# Patient Record
Sex: MALE | Race: WHITE
[De-identification: names, ages, dates, MRNs, and addresses within clinical notes are randomized per-mention and may not be internally consistent; named-entity substitution may affect disease eponyms.]

---

## 2020-07-31 ENCOUNTER — HOSPITAL ENCOUNTER (EMERGENCY)
Dept: HOSPITAL 62 - ER | Age: 30
LOS: 1 days | Discharge: HOME | End: 2020-08-01
Payer: OTHER GOVERNMENT

## 2020-07-31 DIAGNOSIS — Z88.0: ICD-10-CM

## 2020-07-31 DIAGNOSIS — R00.0: ICD-10-CM

## 2020-07-31 DIAGNOSIS — R50.9: ICD-10-CM

## 2020-07-31 DIAGNOSIS — L03.317: ICD-10-CM

## 2020-07-31 DIAGNOSIS — L05.01: Primary | ICD-10-CM

## 2020-07-31 DIAGNOSIS — Z87.891: ICD-10-CM

## 2020-07-31 LAB
ADD MANUAL DIFF: NO
ALBUMIN SERPL-MCNC: 4.6 G/DL (ref 3.5–5)
ALP SERPL-CCNC: 81 U/L (ref 38–126)
ANION GAP SERPL CALC-SCNC: 8 MMOL/L (ref 5–19)
AST SERPL-CCNC: 30 U/L (ref 17–59)
BASE EXCESS BLDV CALC-SCNC: -0.4 MMOL/L
BASOPHILS # BLD AUTO: 0.2 10^3/UL (ref 0–0.2)
BASOPHILS NFR BLD AUTO: 0.8 % (ref 0–2)
BILIRUB DIRECT SERPL-MCNC: 0.1 MG/DL (ref 0–0.4)
BILIRUB SERPL-MCNC: 0.9 MG/DL (ref 0.2–1.3)
BUN SERPL-MCNC: 10 MG/DL (ref 7–20)
CALCIUM: 9.4 MG/DL (ref 8.4–10.2)
CHLORIDE SERPL-SCNC: 102 MMOL/L (ref 98–107)
CO2 SERPL-SCNC: 27 MMOL/L (ref 22–30)
EOSINOPHIL # BLD AUTO: 0 10^3/UL (ref 0–0.6)
EOSINOPHIL NFR BLD AUTO: 0.2 % (ref 0–6)
ERYTHROCYTE [DISTWIDTH] IN BLOOD BY AUTOMATED COUNT: 12.4 % (ref 11.5–14)
GLUCOSE SERPL-MCNC: 102 MG/DL (ref 75–110)
HCO3 BLDV-SCNC: 23.9 MMOL/L (ref 20–32)
HCT VFR BLD CALC: 44.4 % (ref 37.9–51)
HGB BLD-MCNC: 15.2 G/DL (ref 13.5–17)
INR PPP: 1
LYMPHOCYTES # BLD AUTO: 2.5 10^3/UL (ref 0.5–4.7)
LYMPHOCYTES NFR BLD AUTO: 12.5 % (ref 13–45)
MCH RBC QN AUTO: 30.8 PG (ref 27–33.4)
MCHC RBC AUTO-ENTMCNC: 34.3 G/DL (ref 32–36)
MCV RBC AUTO: 90 FL (ref 80–97)
MONOCYTES # BLD AUTO: 1.1 10^3/UL (ref 0.1–1.4)
MONOCYTES NFR BLD AUTO: 5.5 % (ref 3–13)
NEUTROPHILS # BLD AUTO: 16.1 10^3/UL (ref 1.7–8.2)
NEUTS SEG NFR BLD AUTO: 81 % (ref 42–78)
PCO2 BLDV: 38.4 MMHG (ref 35–63)
PH BLDV: 7.41 [PH] (ref 7.3–7.42)
PLATELET # BLD: 276 10^3/UL (ref 150–450)
POTASSIUM SERPL-SCNC: 4.4 MMOL/L (ref 3.6–5)
PROT SERPL-MCNC: 7.8 G/DL (ref 6.3–8.2)
PROTHROMBIN TIME: 13.4 SEC (ref 11.4–15.4)
RBC # BLD AUTO: 4.95 10^6/UL (ref 4.35–5.55)
TOTAL CELLS COUNTED % (AUTO): 100 %
WBC # BLD AUTO: 19.8 10^3/UL (ref 4–10.5)

## 2020-07-31 PROCEDURE — 85025 COMPLETE CBC W/AUTO DIFF WBC: CPT

## 2020-07-31 PROCEDURE — 96361 HYDRATE IV INFUSION ADD-ON: CPT

## 2020-07-31 PROCEDURE — 80053 COMPREHEN METABOLIC PANEL: CPT

## 2020-07-31 PROCEDURE — 87077 CULTURE AEROBIC IDENTIFY: CPT

## 2020-07-31 PROCEDURE — 87070 CULTURE OTHR SPECIMN AEROBIC: CPT

## 2020-07-31 PROCEDURE — 87205 SMEAR GRAM STAIN: CPT

## 2020-07-31 PROCEDURE — 96375 TX/PRO/DX INJ NEW DRUG ADDON: CPT

## 2020-07-31 PROCEDURE — 93005 ELECTROCARDIOGRAM TRACING: CPT

## 2020-07-31 PROCEDURE — 93010 ELECTROCARDIOGRAM REPORT: CPT

## 2020-07-31 PROCEDURE — 36415 COLL VENOUS BLD VENIPUNCTURE: CPT

## 2020-07-31 PROCEDURE — 10080 I&D PILONIDAL CYST SIMPLE: CPT

## 2020-07-31 PROCEDURE — 96365 THER/PROPH/DIAG IV INF INIT: CPT

## 2020-07-31 PROCEDURE — 85610 PROTHROMBIN TIME: CPT

## 2020-07-31 PROCEDURE — 82803 BLOOD GASES ANY COMBINATION: CPT

## 2020-07-31 PROCEDURE — 87075 CULTR BACTERIA EXCEPT BLOOD: CPT

## 2020-07-31 PROCEDURE — 99283 EMERGENCY DEPT VISIT LOW MDM: CPT

## 2020-07-31 PROCEDURE — 83605 ASSAY OF LACTIC ACID: CPT

## 2020-07-31 PROCEDURE — 87040 BLOOD CULTURE FOR BACTERIA: CPT

## 2020-07-31 NOTE — ER DOCUMENT REPORT
ED Skin Rash/Insect Bite/Abscs





- General


Chief Complaint: Cyst


Stated Complaint: CYST,FEVER


Time Seen by Provider: 07/31/20 20:43


Primary Care Provider: 


EMI OWENS [Primary Care Provider] - Follow up as needed


Mode of Arrival: Ambulatory


Information source: Patient


Notes: 





Patient presents complaining of pilonidal abscess for the past week.  Patient 

was seen at his doctor's office this afternoon and had an incision and drainage 

procedure performed.  Patient has not been able to get his antibiotics filled 

just yet and developed a fever.  Patient called his doctor who advised him to 

come to the ER for further management.





- HPI


Patient complains to provider of: Tender/swollen area


Onset: Last week


Onset/Duration: Worse


Quality of pain: Sharp


Pain Level: 5


Skin Character: Abscess, Swelling, Tenderness, Warm


Skin Temperature: Warm


Quality of rash: Painful


Exacerbated by: Movement


Relieved by: Denies


Similar symptoms previously: Yes


Recently seen / treated by doctor: Yes





- Related Data


Allergies/Adverse Reactions: 


                                        





Penicillins Allergy (Verified 07/31/20 21:38)


   











Past Medical History





- General


Information source: Patient





- Social History


Smoking Status: Former Smoker


Frequency of alcohol use: Occasional


Drug Abuse: None


Occupation: None


Lives with: Family


Family History: Reviewed & Not Pertinent





- Medical History


Medical History: Negative


Past Surgical History: Reports: Hx Tonsillectomy





Review of Systems





- Review of Systems


Constitutional: Fever


EENT: No symptoms reported


Cardiovascular: No symptoms reported.  denies: Chest pain, Dizziness


Respiratory: No symptoms reported.  denies: Cough, Short of breath


Gastrointestinal: No symptoms reported.  denies: Vomiting


Genitourinary: No symptoms reported


Male Genitourinary: No symptoms reported


Musculoskeletal: No symptoms reported


Skin: Other - Abscess with surrounding erythema


Hematologic/Lymphatic: No symptoms reported


Neurological/Psychological: No symptoms reported





Physical Exam





- Vital signs


Vitals: 


                                        











Temp Pulse Resp BP Pulse Ox


 


 102.5 F H  118 H  28 H  116/75   99 


 


 07/31/20 20:08  07/31/20 20:08  07/31/20 20:08  07/31/20 20:08  07/31/20 20:08














- General


General appearance: Appears well, Alert


In distress: None





- HEENT


Head: Normocephalic, Atraumatic


Eyes: Normal


Conjunctiva: Normal


Nasal: Normal


Mouth/Lips: Normal


Neck: Normal, Supple.  No: Lymphadenopathy





- Respiratory


Respiratory status: No respiratory distress


Chest status: Nontender


Breath sounds: Normal.  No: Rales, Rhonchi, Stridor


Chest palpation: Normal





- Cardiovascular


Rhythm: Tachycardia


Heart sounds: S1 appreciated, S2 appreciated


Murmur: No





- Abdominal


Inspection: Normal


Distension: No distension


Bowel sounds: Normal


Tenderness: Nontender





- Back


Back: Normal, Nontender





- Extremities


General upper extremity: Normal inspection, Normal ROM


General lower extremity: Normal inspection, Normal ROM





- Neurological


Neuro grossly intact: Yes


Cognition: Normal


[place] Coma Scale Eye Opening: Spontaneous


[place] Coma Scale Verbal: Oriented


[place] Coma Scale Motor: Obeys Commands


[place] Coma Scale Total: 15





- Psychological


Associated symptoms: Normal affect, Normal mood





- Skin


Skin Temperature: Warm


Skin Moisture: Dry


Skin Color: Erythema


Skin irregularity: Abscess - Patient with fluctuant pilonidal abscess patient 

with very small 0.5 cm incision with iodoform gauze in place





Course





- Re-evaluation


Re-evalutation: 





07/31/20 23:03


Consulted with Dr. Villagran regarding patient presentation and choice of 

antibiotics.  He advises giving a dose of clindamycin IV and giving 450 orally 3

times daily


08/01/20 00:26


Patient defervesced and vital signs have improved.  Patient does have a marked 

leukocytosis although does have an abscess with surrounding cellulitis.  Abscess

was drained and patient tolerated procedure well.  Wound culture was obtained.  

Patient with no elevation in lactic acid at this time.  Patient nontoxic in 

appearance.  Discussed with patient worsening signs or symptoms that he should 

return immediately for.  Patient verbalized understanding and is agreeable with 

discharge plan of care at this time.


08/01/20 00:34


Consulted with Dr. Villagran regarding discharge plan of care.  Dr. Villagran agrees

with plan for discharge as the patient is clinically improved with stable vital 

signs.





- Vital Signs


Vital signs: 


                                        











Temp Pulse Resp BP Pulse Ox


 


 97.3 F   87   16   123/68   97 


 


 08/01/20 00:48  08/01/20 00:48  08/01/20 00:48  08/01/20 00:48  08/01/20 00:48














- Laboratory


Result Diagrams: 


                                 07/31/20 21:20





                                 07/31/20 21:20


Laboratory results interpreted by me: 


                                        











  07/31/20 07/31/20 07/31/20





  21:20 21:20 23:43


 


WBC  19.8 H  


 


Lymph % (Auto)  12.5 L  


 


Absolute Neuts (auto)  16.1 H  


 


Seg Neutrophils %  81.0 H  


 


Sodium   136.7 L 


 


Lactic Acid    0.6 L











                              Labs- All tests 24 hr











  07/31/20 07/31/20 07/31/20





  21:20 21:20 21:20


 


WBC  19.8 H  


 


RBC  4.95  


 


Hgb  15.2  


 


Hct  44.4  


 


MCV  90  


 


MCH  30.8  


 


MCHC  34.3  


 


RDW  12.4  


 


Plt Count  276  


 


Lymph % (Auto)  12.5 L  


 


Mono % (Auto)  5.5  


 


Eos % (Auto)  0.2  


 


Baso % (Auto)  0.8  


 


Absolute Neuts (auto)  16.1 H  


 


Absolute Lymphs (auto)  2.5  


 


Absolute Monos (auto)  1.1  


 


Absolute Eos (auto)  0.0  


 


Absolute Basos (auto)  0.2  


 


Seg Neutrophils %  81.0 H  


 


PT   13.4 


 


INR   1.00 


 


VBG pH   


 


VBG pCO2   


 


VBG HCO3   


 


VBG Base Excess   


 


Sodium    136.7 L


 


Potassium    4.4


 


Chloride    102


 


Carbon Dioxide    27


 


Anion Gap    8


 


BUN    10


 


Creatinine    0.94


 


Est GFR ( Amer)    > 60


 


Est GFR (MDRD) Non-Af    > 60


 


Glucose    102


 


Lactic Acid   


 


Calcium    9.4


 


Total Bilirubin    0.9


 


Direct Bilirubin    0.1


 


Neonat Total Bilirubin    Not Reportable


 


Neonat Direct Bilirubin    Not Reportable


 


Neonat Indirect Bili    Not Reportable


 


AST    30


 


ALT    29


 


Alkaline Phosphatase    81


 


Total Protein    7.8


 


Albumin    4.6














  07/31/20 07/31/20





  22:01 23:43


 


WBC  


 


RBC  


 


Hgb  


 


Hct  


 


MCV  


 


MCH  


 


MCHC  


 


RDW  


 


Plt Count  


 


Lymph % (Auto)  


 


Mono % (Auto)  


 


Eos % (Auto)  


 


Baso % (Auto)  


 


Absolute Neuts (auto)  


 


Absolute Lymphs (auto)  


 


Absolute Monos (auto)  


 


Absolute Eos (auto)  


 


Absolute Basos (auto)  


 


Seg Neutrophils %  


 


PT  


 


INR  


 


VBG pH  7.41 


 


VBG pCO2  38.4 


 


VBG HCO3  23.9 


 


VBG Base Excess  -0.4 


 


Sodium  


 


Potassium  


 


Chloride  


 


Carbon Dioxide  


 


Anion Gap  


 


BUN  


 


Creatinine  


 


Est GFR ( Amer)  


 


Est GFR (MDRD) Non-Af  


 


Glucose  


 


Lactic Acid   0.6 L


 


Calcium  


 


Total Bilirubin  


 


Direct Bilirubin  


 


Neonat Total Bilirubin  


 


Neonat Direct Bilirubin  


 


Neonat Indirect Bili  


 


AST  


 


ALT  


 


Alkaline Phosphatase  


 


Total Protein  


 


Albumin  














- EKG Interpretation by Me


EKG shows normal: Sinus rhythm


Rate: Tachycardia


Additional EKG results interpreted by me: 





07/31/20 21:25


Sinus tachycardia with a rate of 115, QTc 415, no ST elevation or T wave 

inversion, no acute ischemic changes





Procedures





- Incision and Drainage


  ** Buttock


Type: Simple


Anesthetic type: 1% Lidocaine, Other - buffered with sodium bicarb


Blade size: 11


I&D procedure: Betadine prep applied


Incision Method: Incision made by scalpel


Amount/type of drainage: Large amount of foul-smelling purulent drainage





Discharge





- Discharge


Clinical Impression: 


 Pilonidal abscess





Fever


Qualifiers:


 Fever type: unspecified Qualified Code(s): R50.9 - Fever, unspecified





Cellulitis


Qualifiers:


 Site of cellulitis: buttock Qualified Code(s): L03.317 - Cellulitis of buttock





Condition: Stable


Disposition: HOME, SELF-CARE


Instructions:  Abscess ([facility]), Cellulitis ([facility]), Clindamycin ([facility]), Post Incision

and Drainage


Additional Instructions: 


Return immediately for any new or worsening symptoms





Followup with your primary care provider, call tomorrow to make a followup 

appointment





Return tomorrow for a recheck if you have any persistent fever








Prescriptions: 


Clindamycin HCl [Cleocin 150 mg Capsule] 450 mg PO TID #63 capsule


Hydrocodone/Acetaminophen [Norco 5-325 mg Tablet] 1 tab PO Q6 PRN #12 tablet


 PRN Reason: 


Referrals: 


[facility] [Primary [facility]] - Follow up as needed

## 2020-07-31 NOTE — EKG REPORT
SEVERITY:- OTHERWISE NORMAL ECG -

SINUS TACHYCARDIA

:

Confirmed by: Demetra Tong MD 31-Jul-2020 23:00:59

## 2020-08-01 VITALS — SYSTOLIC BLOOD PRESSURE: 123 MMHG | DIASTOLIC BLOOD PRESSURE: 68 MMHG
